# Patient Record
Sex: FEMALE | Race: WHITE | Employment: OTHER | URBAN - NONMETROPOLITAN AREA
[De-identification: names, ages, dates, MRNs, and addresses within clinical notes are randomized per-mention and may not be internally consistent; named-entity substitution may affect disease eponyms.]

---

## 2019-08-29 ENCOUNTER — HOSPITAL ENCOUNTER (EMERGENCY)
Facility: HOSPITAL | Age: 74
Discharge: HOME | End: 2019-08-29
Attending: EMERGENCY MEDICINE | Admitting: EMERGENCY MEDICINE
Payer: MEDICARE

## 2019-08-29 VITALS
RESPIRATION RATE: 18 BRPM | HEART RATE: 74 BPM | SYSTOLIC BLOOD PRESSURE: 159 MMHG | DIASTOLIC BLOOD PRESSURE: 72 MMHG | BODY MASS INDEX: 35.65 KG/M2 | WEIGHT: 200 LBS | OXYGEN SATURATION: 96 % | TEMPERATURE: 98.3 F

## 2019-08-29 DIAGNOSIS — N30.00 ACUTE CYSTITIS WITHOUT HEMATURIA: Primary | ICD-10-CM

## 2019-08-29 LAB
ALBUMIN SERPL-MCNC: 2.9 G/DL (ref 3.4–5)
ALBUMIN UR-MCNC: 100 MG/DL
ALP SERPL-CCNC: 65 U/L (ref 40–150)
ALT SERPL W P-5'-P-CCNC: 40 U/L (ref 0–50)
ANION GAP SERPL CALCULATED.3IONS-SCNC: 10 MMOL/L (ref 3–14)
APPEARANCE UR: ABNORMAL
AST SERPL W P-5'-P-CCNC: 33 U/L (ref 0–45)
BACTERIA #/AREA URNS HPF: ABNORMAL /HPF
BASOPHILS # BLD AUTO: 0 10E9/L (ref 0–0.2)
BASOPHILS NFR BLD AUTO: 0.2 %
BILIRUB SERPL-MCNC: 0.4 MG/DL (ref 0.2–1.3)
BILIRUB UR QL STRIP: NEGATIVE
BUN SERPL-MCNC: 31 MG/DL (ref 7–30)
CALCIUM SERPL-MCNC: 8.4 MG/DL (ref 8.5–10.1)
CHLORIDE SERPL-SCNC: 102 MMOL/L (ref 94–109)
CO2 SERPL-SCNC: 21 MMOL/L (ref 20–32)
COLOR UR AUTO: YELLOW
CREAT SERPL-MCNC: 1.33 MG/DL (ref 0.52–1.04)
CRP SERPL-MCNC: 144 MG/L (ref 0–8)
DIFFERENTIAL METHOD BLD: ABNORMAL
EOSINOPHIL # BLD AUTO: 0 10E9/L (ref 0–0.7)
EOSINOPHIL NFR BLD AUTO: 0.3 %
ERYTHROCYTE [DISTWIDTH] IN BLOOD BY AUTOMATED COUNT: 15.2 % (ref 10–15)
GFR SERPL CREATININE-BSD FRML MDRD: 39 ML/MIN/{1.73_M2}
GLUCOSE SERPL-MCNC: 188 MG/DL (ref 70–99)
GLUCOSE UR STRIP-MCNC: NEGATIVE MG/DL
HCT VFR BLD AUTO: 29.4 % (ref 35–47)
HGB BLD-MCNC: 9.4 G/DL (ref 11.7–15.7)
HGB UR QL STRIP: NEGATIVE
IMM GRANULOCYTES # BLD: 0.1 10E9/L (ref 0–0.4)
IMM GRANULOCYTES NFR BLD: 0.8 %
KETONES UR STRIP-MCNC: NEGATIVE MG/DL
LACTATE BLD-SCNC: 1.7 MMOL/L (ref 0.7–2)
LEUKOCYTE ESTERASE UR QL STRIP: ABNORMAL
LIPASE SERPL-CCNC: 118 U/L (ref 73–393)
LYMPHOCYTES # BLD AUTO: 0.9 10E9/L (ref 0.8–5.3)
LYMPHOCYTES NFR BLD AUTO: 6.4 %
MCH RBC QN AUTO: 28.9 PG (ref 26.5–33)
MCHC RBC AUTO-ENTMCNC: 32 G/DL (ref 31.5–36.5)
MCV RBC AUTO: 91 FL (ref 78–100)
MONOCYTES # BLD AUTO: 1.2 10E9/L (ref 0–1.3)
MONOCYTES NFR BLD AUTO: 8.7 %
NEUTROPHILS # BLD AUTO: 11.1 10E9/L (ref 1.6–8.3)
NEUTROPHILS NFR BLD AUTO: 83.6 %
NITRATE UR QL: NEGATIVE
NRBC # BLD AUTO: 0 10*3/UL
NRBC BLD AUTO-RTO: 0 /100
PH UR STRIP: 6 PH (ref 4.7–8)
PLATELET # BLD AUTO: 245 10E9/L (ref 150–450)
PLATELET # BLD EST: ABNORMAL 10*3/UL
POTASSIUM SERPL-SCNC: 4 MMOL/L (ref 3.4–5.3)
PROT SERPL-MCNC: 7.3 G/DL (ref 6.8–8.8)
RBC # BLD AUTO: 3.25 10E12/L (ref 3.8–5.2)
RBC #/AREA URNS AUTO: 0 /HPF (ref 0–2)
RBC MORPH BLD: ABNORMAL
SODIUM SERPL-SCNC: 133 MMOL/L (ref 133–144)
SOURCE: ABNORMAL
SP GR UR STRIP: 1.02 (ref 1–1.03)
SQUAMOUS #/AREA URNS AUTO: 15 /HPF (ref 0–1)
UROBILINOGEN UR STRIP-MCNC: 2 MG/DL (ref 0–2)
WBC # BLD AUTO: 13.3 10E9/L (ref 4–11)
WBC #/AREA URNS AUTO: >182 /HPF (ref 0–5)
WBC CLUMPS #/AREA URNS HPF: PRESENT /HPF

## 2019-08-29 PROCEDURE — 86140 C-REACTIVE PROTEIN: CPT | Performed by: EMERGENCY MEDICINE

## 2019-08-29 PROCEDURE — 81001 URINALYSIS AUTO W/SCOPE: CPT | Performed by: EMERGENCY MEDICINE

## 2019-08-29 PROCEDURE — 83605 ASSAY OF LACTIC ACID: CPT | Performed by: EMERGENCY MEDICINE

## 2019-08-29 PROCEDURE — 83690 ASSAY OF LIPASE: CPT | Performed by: EMERGENCY MEDICINE

## 2019-08-29 PROCEDURE — 99283 EMERGENCY DEPT VISIT LOW MDM: CPT

## 2019-08-29 PROCEDURE — 36415 COLL VENOUS BLD VENIPUNCTURE: CPT | Performed by: EMERGENCY MEDICINE

## 2019-08-29 PROCEDURE — 85025 COMPLETE CBC W/AUTO DIFF WBC: CPT | Performed by: EMERGENCY MEDICINE

## 2019-08-29 PROCEDURE — 99284 EMERGENCY DEPT VISIT MOD MDM: CPT | Mod: Z6 | Performed by: EMERGENCY MEDICINE

## 2019-08-29 PROCEDURE — 80053 COMPREHEN METABOLIC PANEL: CPT | Performed by: EMERGENCY MEDICINE

## 2019-08-29 RX ORDER — HYDRALAZINE HYDROCHLORIDE 50 MG/1
50 TABLET, FILM COATED ORAL 3 TIMES DAILY
COMMUNITY

## 2019-08-29 RX ORDER — GLIPIZIDE 5 MG/1
5 TABLET ORAL DAILY
COMMUNITY

## 2019-08-29 RX ORDER — CARVEDILOL 25 MG/1
25 TABLET ORAL 2 TIMES DAILY WITH MEALS
COMMUNITY

## 2019-08-29 RX ORDER — NITROFURANTOIN 25; 75 MG/1; MG/1
100 CAPSULE ORAL 2 TIMES DAILY
Qty: 14 CAPSULE | Refills: 0 | Status: SHIPPED | OUTPATIENT
Start: 2019-08-29 | End: 2021-11-19

## 2019-08-29 RX ORDER — LOSARTAN POTASSIUM 100 MG/1
25 TABLET ORAL DAILY
COMMUNITY

## 2019-08-29 RX ORDER — MULTIVITAMIN WITH IRON
1 TABLET ORAL DAILY
COMMUNITY
End: 2021-11-19

## 2019-08-29 RX ORDER — FUROSEMIDE 40 MG
40 TABLET ORAL DAILY
COMMUNITY

## 2019-08-29 RX ORDER — CALCIUM CARBONATE/VITAMIN D3 500-10/5ML
400 LIQUID (ML) ORAL 2 TIMES DAILY
COMMUNITY

## 2019-08-29 ASSESSMENT — ENCOUNTER SYMPTOMS
ARTHRALGIAS: 0
NECK STIFFNESS: 0
DIFFICULTY URINATING: 0
SHORTNESS OF BREATH: 0
ABDOMINAL PAIN: 1
HEADACHES: 0
CONFUSION: 0
EYE REDNESS: 0
FEVER: 0
COLOR CHANGE: 0

## 2019-08-29 NOTE — ED AVS SNAPSHOT
[facility]  [address]  [place] [address]  Phone:  [phone]                                    Dayana Lema   MRN: [id number]    Department:  [facility]   Date of Visit:  8/29/2019           After Visit Summary Signature Page    I have received my discharge instructions, and my questions have been answered. I have discussed any challenges I see with this plan with the nurse or doctor.    ..........................................................................................................................................  Patient/Patient Representative Signature      ..........................................................................................................................................  Patient Representative Print Name and Relationship to Patient    ..................................................               ................................................  Date                                   Time    ..........................................................................................................................................  Reviewed by Signature/Title    ...................................................              ..............................................  Date                                               Time          22EPIC Rev 08/18

## 2019-08-29 NOTE — ED PROVIDER NOTES
History     Chief Complaint   Patient presents with     Abdominal Pain     since Tuesday and is mainly on left flank and goes across abd     HPI  Dayana Lema is a 74 year old female who presented to the ED accompanied by the spouse.  Patient gives a 2-day history of left flank pain that is rated as 2/10.  Pain does not radiate anywhere.  No known aggravating or relieving factors.  Patient has known history of polycystic kidney disease.  Patient takes Lasix every day and also has history of diabetes mellitus, aortic stenosis, hypertension and hypercholesterolemia.  The pain is intermittent and now at the time of history and physical its rated as 1/10.    Allergies:  Allergies   Allergen Reactions     Codeine Nausea and Vomiting       Problem List:    There are no active problems to display for this patient.       Past Medical History:    Past Medical History:   Diagnosis Date     Aortic stenosis 07/2006     Aortic valve disorders 06/18/2009     Bilateral cataracts      Closed fracture of unspecified part of forearm 1957     External hemorrhoids without mention of complication 07/12/2006     Hypertension      Internal hemorrhoids without mention of complication 07/12/2006     Iron deficiency anemia, unspecified 07/12/2006     Other and unspecified ovarian cyst 01/31/2011     Other malaise and fatigue 6/19/02     Pure hypercholesterolemia 06/19/2002     Type 2 diabetes mellitus without complications (H)      Undiagnosed cardiac murmurs 07/11/2006     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     Unspecified essential hypertension      Unspecified hypothyroidism      Unspecified venous (peripheral) insufficiency 11/16/1994       Past Surgical History:    Past Surgical History:   Procedure Laterality Date     COLONOSCOPY       GYN SURGERY       PHACOEMULSIFICATION WITH STANDARD INTRAOCULAR LENS IMPLANT  3/3/2014    Procedure: PHACOEMULSIFICATION WITH STANDARD INTRAOCULAR LENS IMPLANT;  CATARACT  EXTRACTION WITH INTRA OCULAR LENS LEFT;  Surgeon: Robert Connolly MD;  Location: [facility]     PHACOEMULSIFICATION WITH STANDARD INTRAOCULAR LENS IMPLANT  3/24/2014    Procedure: PHACOEMULSIFICATION WITH STANDARD INTRAOCULAR LENS IMPLANT;  CATARACT EXTRACTION WITH INTRA OCULAR LENS RIGHT;  Surgeon: Robert Connolly MD;  Location: [facility]       Family History:    No family history on file.    Social History:  Marital Status:   [2]  Social History     Tobacco Use     Smoking status: Not on file   Substance Use Topics     Alcohol use: Not on file     Drug use: Not on file        Medications:      ATORVASTATIN CALCIUM PO   carvedilol (COREG) 25 MG tablet   Elastic Bandages & Supports (JOBST ANTI-EM THIGH HIGH XL) MISC   ferrous sulfate 325 (65 FE) MG tablet   furosemide (LASIX) 40 MG tablet   glipiZIDE (GLUCOTROL) 5 MG tablet   hydrALAZINE (APRESOLINE) 50 MG tablet   losartan (COZAAR) 25 MG tablet   magnesium 250 MG tablet   magnesium oxide 400 MG CAPS   metFORMIN (GLUCOPHAGE) 850 MG tablet   nitroFURantoin macrocrystal-monohydrate (MACROBID) 100 MG capsule   POTASSIUM CHLORIDE OSEAS ER TAB PO   UNABLE TO FIND   Blood Glucose Monitoring Suppl [place]   calcium-vitamin D (CALTRATE) 600-400 MG-UNIT per tablet   Flaxseed, Linseed, (FLAXSEED OIL) 1000 MG CAPS   glucose blood VI test strips strip   LEVOTHYROXINE SODIUM PO   lisinopril-hydrochlorothiazide (PRINZIDE,ZESTORETIC) 20-12.5 MG per tablet   multivitamin, therapeutic with minerals (MULTI-VITAMIN) TABS   ONETOUCH DELICA LANCETS FINE MISC         Review of Systems   Constitutional: Negative for fever.   HENT: Negative for congestion.    Eyes: Negative for redness.   Respiratory: Negative for shortness of breath.    Cardiovascular: Negative for chest pain.   Gastrointestinal: Positive for abdominal pain.   Genitourinary: Negative for difficulty urinating.   Musculoskeletal: Negative for arthralgias and neck stiffness.   Skin: Negative for color change.   Neurological:  Negative for headaches.   Psychiatric/Behavioral: Negative for confusion.   All other systems reviewed and are negative.      Physical Exam   BP: 148/64  Pulse: 82  Heart Rate: 75  Temp: 99.6  F (37.6  C)  Weight: 90.7 kg (200 lb)  SpO2: 98 %      Physical Exam   Constitutional: She is oriented to person, place, and time. She appears well-developed and well-nourished. No distress.   HENT:   Head: Normocephalic and atraumatic.   Mouth/Throat: Oropharynx is clear and moist. No oropharyngeal exudate.   Eyes: Pupils are equal, round, and reactive to light. No scleral icterus.   Neck: Normal range of motion. Neck supple.   Cardiovascular: Normal rate, regular rhythm, normal heart sounds and intact distal pulses.   Pulmonary/Chest: Effort normal and breath sounds normal. No stridor. No respiratory distress. She has no wheezes.   Abdominal: Soft. Normal aorta and bowel sounds are normal. She exhibits no distension. There is no tenderness. There is no guarding.   Musculoskeletal: She exhibits no edema or tenderness.   Neurological: She is alert and oriented to person, place, and time.   Skin: Skin is warm and dry. No rash noted. She is not diaphoretic. No erythema. No pallor.   Nursing note and vitals reviewed.      ED Course        Procedures      Results for orders placed or performed during the hospital encounter of 08/29/19 (from the past 24 hour(s))   CBC with platelets differential   Result Value Ref Range    WBC 13.3 (H) 4.0 - 11.0 10e9/L    RBC Count 3.25 (L) 3.8 - 5.2 10e12/L    Hemoglobin 9.4 (L) 11.7 - 15.7 g/dL    Hematocrit 29.4 (L) 35.0 - 47.0 %    MCV 91 78 - 100 fl    MCH 28.9 26.5 - 33.0 pg    MCHC 32.0 31.5 - 36.5 g/dL    RDW 15.2 (H) 10.0 - 15.0 %    Platelet Count 245 150 - 450 10e9/L    Diff Method Automated Method     % Neutrophils 83.6 %    % Lymphocytes 6.4 %    % Monocytes 8.7 %    % Eosinophils 0.3 %    % Basophils 0.2 %    % Immature Granulocytes 0.8 %    Nucleated RBCs 0 0 /100    Absolute  Neutrophil 11.1 (H) 1.6 - 8.3 10e9/L    Absolute Lymphocytes 0.9 0.8 - 5.3 10e9/L    Absolute Monocytes 1.2 0.0 - 1.3 10e9/L    Absolute Eosinophils 0.0 0.0 - 0.7 10e9/L    Absolute Basophils 0.0 0.0 - 0.2 10e9/L    Abs Immature Granulocytes 0.1 0 - 0.4 10e9/L    Absolute Nucleated RBC 0.0     RBC Morphology Consistent with reported results     Platelet Estimate       Automated count confirmed.  Platelet morphology is normal.   Comprehensive metabolic panel   Result Value Ref Range    Sodium 133 133 - 144 mmol/L    Potassium 4.0 3.4 - 5.3 mmol/L    Chloride 102 94 - 109 mmol/L    Carbon Dioxide 21 20 - 32 mmol/L    Anion Gap 10 3 - 14 mmol/L    Glucose 188 (H) 70 - 99 mg/dL    Urea Nitrogen 31 (H) 7 - 30 mg/dL    Creatinine 1.33 (H) 0.52 - 1.04 mg/dL    GFR Estimate 39 (L) >60 mL/min/[1.73_m2]    GFR Estimate If Black 46 (L) >60 mL/min/[1.73_m2]    Calcium 8.4 (L) 8.5 - 10.1 mg/dL    Bilirubin Total 0.4 0.2 - 1.3 mg/dL    Albumin 2.9 (L) 3.4 - 5.0 g/dL    Protein Total 7.3 6.8 - 8.8 g/dL    Alkaline Phosphatase 65 40 - 150 U/L    ALT 40 0 - 50 U/L    AST 33 0 - 45 U/L   CRP inflammation   Result Value Ref Range    CRP Inflammation 144.0 (H) 0.0 - 8.0 mg/L   Lactic acid whole blood   Result Value Ref Range    Lactic Acid 1.7 0.7 - 2.0 mmol/L   Lipase   Result Value Ref Range    Lipase 118 73 - 393 U/L   UA reflex to Microscopic and Culture   Result Value Ref Range    Color Urine Yellow     Appearance Urine Slightly Cloudy     Glucose Urine Negative NEG^Negative mg/dL    Bilirubin Urine Negative NEG^Negative    Ketones Urine Negative NEG^Negative mg/dL    Specific Gravity Urine 1.019 1.003 - 1.035    Blood Urine Negative NEG^Negative    pH Urine 6.0 4.7 - 8.0 pH    Protein Albumin Urine 100 (A) NEG^Negative mg/dL    Urobilinogen mg/dL 2.0 0.0 - 2.0 mg/dL    Nitrite Urine Negative NEG^Negative    Leukocyte Esterase Urine Large (A) NEG^Negative    Source Midstream Urine     RBC Urine 0 0 - 2 /HPF    WBC Urine >182 (H) 0  - 5 /HPF    WBC Clumps Present (A) NEG^Negative /HPF    Bacteria Urine Many (A) NEG^Negative /HPF    Squamous Epithelial /HPF Urine 15 (H) 0 - 1 /HPF       Medications - No data to display    Assessments & Plan (with Medical Decision Making)   Acute cystitis without hematuria: Started on nitrofurantoin and encouraged to drink plenty of fluids.  Written and verbal discharge instructions given.  Return to ED if she develops fever, vomiting or worsening of condition.  Verbalized understanding and was in agreement with treatment plan.  Discharged home in stable condition.    I have reviewed the nursing notes.    I have reviewed the findings, diagnosis, plan and need for follow up with the patient.    New Prescriptions    NITROFURANTOIN MACROCRYSTAL-MONOHYDRATE (MACROBID) 100 MG CAPSULE    Take 1 capsule (100 mg) by mouth 2 times daily       Final diagnoses:   Acute cystitis without hematuria       8/29/2019   [facility]     Marcelo Frias MD  08/29/19 1325

## 2019-08-29 NOTE — ED NOTES
Pt to the ED with spouse with c/o left sided abd pain that started on Tuesday.  Pt states initially pain across abd and now more left side slightly to left flank.  Not eating or drinking much since Tuesday.  Small BMs 2-3 times daily.  Denies s/sx of UTI.  Hx polycystic kidney disease.  Assessment is complete.  Call light is given. BP cuff and sat monitor on pt. Spouse at bedside.

## 2019-08-30 NOTE — ED NOTES
Discharge papers and Rx given to pt. Verbalizes understanding of discharge dx, follow up with PCP, fill Rx and start taking.  VS as charted.  Rates pain 2/10.  Discharge ambulatory to home with spouse.

## 2021-11-19 ENCOUNTER — LAB (OUTPATIENT)
Dept: LAB | Facility: OTHER | Age: 76
End: 2021-11-19
Payer: MEDICARE

## 2021-11-19 DIAGNOSIS — N18.30 CKD (CHRONIC KIDNEY DISEASE) STAGE 3, GFR 30-59 ML/MIN (H): Primary | ICD-10-CM

## 2021-11-19 LAB
ALBUMIN SERPL-MCNC: 3.4 G/DL (ref 3.4–5)
ANION GAP SERPL CALCULATED.3IONS-SCNC: 4 MMOL/L (ref 3–14)
BUN SERPL-MCNC: 42 MG/DL (ref 7–30)
CALCIUM SERPL-MCNC: 9.2 MG/DL (ref 8.5–10.1)
CHLORIDE BLD-SCNC: 111 MMOL/L (ref 94–109)
CO2 SERPL-SCNC: 22 MMOL/L (ref 20–32)
CREAT SERPL-MCNC: 1.25 MG/DL (ref 0.52–1.04)
GFR SERPL CREATININE-BSD FRML MDRD: 42 ML/MIN/1.73M2
GLUCOSE BLD-MCNC: 94 MG/DL (ref 70–99)
PHOSPHATE SERPL-MCNC: 3.1 MG/DL (ref 2.5–4.5)
POTASSIUM BLD-SCNC: 4.5 MMOL/L (ref 3.4–5.3)
SODIUM SERPL-SCNC: 137 MMOL/L (ref 133–144)

## 2021-11-19 PROCEDURE — 80069 RENAL FUNCTION PANEL: CPT | Mod: ZL

## 2021-11-19 PROCEDURE — 36415 COLL VENOUS BLD VENIPUNCTURE: CPT | Mod: ZL

## 2021-11-19 RX ORDER — AMLODIPINE BESYLATE 10 MG/1
10 TABLET ORAL DAILY
COMMUNITY

## 2021-11-19 RX ORDER — LANOLIN ALCOHOL/MO/W.PET/CERES
1000 CREAM (GRAM) TOPICAL DAILY
COMMUNITY

## 2023-03-20 ENCOUNTER — NURSING HOME VISIT (OUTPATIENT)
Dept: FAMILY MEDICINE | Facility: OTHER | Age: 78
End: 2023-03-20
Attending: FAMILY MEDICINE
Payer: COMMERCIAL

## 2023-03-20 VITALS
TEMPERATURE: 98.7 F | HEART RATE: 80 BPM | DIASTOLIC BLOOD PRESSURE: 72 MMHG | OXYGEN SATURATION: 95 % | SYSTOLIC BLOOD PRESSURE: 156 MMHG

## 2023-03-20 DIAGNOSIS — E55.9 VITAMIN D DEFICIENCY: ICD-10-CM

## 2023-03-20 DIAGNOSIS — I10 ESSENTIAL HYPERTENSION: ICD-10-CM

## 2023-03-20 DIAGNOSIS — Q61.2 ADPKD (AUTOSOMAL DOMINANT POLYCYSTIC KIDNEY DISEASE): ICD-10-CM

## 2023-03-20 DIAGNOSIS — I12.9 TYPE 2 DIABETES MELLITUS WITH STAGE 3 CHRONIC KIDNEY DISEASE AND HYPERTENSION (H): ICD-10-CM

## 2023-03-20 DIAGNOSIS — Z78.9 NURSING HOME RESIDENT: Primary | ICD-10-CM

## 2023-03-20 DIAGNOSIS — N18.30 TYPE 2 DIABETES MELLITUS WITH STAGE 3 CHRONIC KIDNEY DISEASE AND HYPERTENSION (H): ICD-10-CM

## 2023-03-20 DIAGNOSIS — E78.00 PURE HYPERCHOLESTEROLEMIA: ICD-10-CM

## 2023-03-20 DIAGNOSIS — E03.9 ACQUIRED HYPOTHYROIDISM: ICD-10-CM

## 2023-03-20 DIAGNOSIS — J98.11 ATELECTASIS: ICD-10-CM

## 2023-03-20 DIAGNOSIS — E11.22 TYPE 2 DIABETES MELLITUS WITH STAGE 3 CHRONIC KIDNEY DISEASE AND HYPERTENSION (H): ICD-10-CM

## 2023-03-20 DIAGNOSIS — Z96.652 S/P TOTAL KNEE ARTHROPLASTY, LEFT: ICD-10-CM

## 2023-03-20 DIAGNOSIS — D50.0 IRON DEFICIENCY ANEMIA DUE TO CHRONIC BLOOD LOSS: ICD-10-CM

## 2023-03-20 DIAGNOSIS — M15.0 PRIMARY OSTEOARTHRITIS INVOLVING MULTIPLE JOINTS: ICD-10-CM

## 2023-03-20 DIAGNOSIS — N18.31 STAGE 3A CHRONIC KIDNEY DISEASE (H): ICD-10-CM

## 2023-03-20 DIAGNOSIS — R09.02 HYPOXIA: ICD-10-CM

## 2023-03-20 PROBLEM — D50.9 IRON DEFICIENCY ANEMIA: Status: ACTIVE | Noted: 2021-05-24

## 2023-03-20 PROBLEM — Z87.448 HISTORY OF ACUTE PYELONEPHRITIS: Status: ACTIVE | Noted: 2019-09-05

## 2023-03-20 PROBLEM — E66.01 MORBID OBESITY (H): Status: ACTIVE | Noted: 2018-07-19

## 2023-03-20 PROBLEM — Z82.49 FAMILY HISTORY OF ANEURYSM OF BLOOD VESSEL OF BRAIN: Status: ACTIVE | Noted: 2018-01-16

## 2023-03-20 PROBLEM — I35.8 AORTIC VALVE SCLEROSIS: Status: ACTIVE | Noted: 2023-03-20

## 2023-03-20 PROBLEM — K76.89 LIVER CYST: Status: ACTIVE | Noted: 2018-08-21

## 2023-03-20 PROBLEM — E87.1 HYPONATREMIA: Status: ACTIVE | Noted: 2017-12-29

## 2023-03-20 PROBLEM — K76.0 FATTY LIVER: Status: ACTIVE | Noted: 2018-08-21

## 2023-03-20 PROBLEM — R80.9 PROTEINURIA, UNSPECIFIED TYPE: Status: ACTIVE | Noted: 2018-09-18

## 2023-03-20 PROBLEM — M81.0 AGE-RELATED OSTEOPOROSIS WITHOUT CURRENT PATHOLOGICAL FRACTURE: Status: ACTIVE | Noted: 2019-06-05

## 2023-03-20 PROBLEM — E83.42 HYPOMAGNESEMIA: Status: ACTIVE | Noted: 2018-04-10

## 2023-03-20 PROCEDURE — 99306 1ST NF CARE HIGH MDM 50: CPT | Performed by: FAMILY MEDICINE

## 2023-03-20 RX ORDER — MULTIVIT-MIN/IRON/FOLIC ACID/K 18-600-40
CAPSULE ORAL
COMMUNITY

## 2023-03-20 RX ORDER — OXYCODONE HYDROCHLORIDE 5 MG/1
5 CAPSULE ORAL EVERY 6 HOURS PRN
COMMUNITY

## 2023-03-20 RX ORDER — ASPIRIN 81 MG/1
81 TABLET ORAL DAILY
COMMUNITY

## 2023-03-20 RX ORDER — ACETAMINOPHEN 325 MG/1
325-650 TABLET ORAL EVERY 4 HOURS PRN
COMMUNITY

## 2023-03-20 RX ORDER — AMOXICILLIN 250 MG
2 CAPSULE ORAL 2 TIMES DAILY PRN
COMMUNITY

## 2023-03-22 PROBLEM — M17.12 PRIMARY OSTEOARTHRITIS OF LEFT KNEE: Status: ACTIVE | Noted: 2022-11-16

## 2023-03-22 PROBLEM — N30.00 ACUTE CYSTITIS: Status: ACTIVE | Noted: 2023-03-14

## 2023-03-22 PROBLEM — I51.89 DIASTOLIC DYSFUNCTION: Status: ACTIVE | Noted: 2023-03-08

## 2023-03-22 PROBLEM — R11.14 BILIOUS VOMITING: Status: ACTIVE | Noted: 2023-03-12

## 2023-03-22 PROBLEM — Z96.652 S/P TOTAL KNEE ARTHROPLASTY, LEFT: Status: ACTIVE | Noted: 2023-03-22

## 2023-03-22 PROBLEM — D62 ANEMIA ASSOCIATED WITH ACUTE BLOOD LOSS: Status: ACTIVE | Noted: 2023-03-08

## 2023-03-22 PROBLEM — N17.9 ACUTE KIDNEY INJURY SUPERIMPOSED ON CHRONIC KIDNEY DISEASE (H): Status: ACTIVE | Noted: 2020-07-11

## 2023-03-22 PROBLEM — Z78.9 NURSING HOME RESIDENT: Status: ACTIVE | Noted: 2023-03-22

## 2023-03-22 PROBLEM — J96.01 ACUTE RESPIRATORY FAILURE WITH HYPOXIA (H): Status: ACTIVE | Noted: 2023-03-12

## 2023-03-22 PROBLEM — N18.9 ACUTE KIDNEY INJURY SUPERIMPOSED ON CHRONIC KIDNEY DISEASE (H): Status: ACTIVE | Noted: 2020-07-11

## 2023-03-22 PROBLEM — J18.9 MULTIFOCAL PNEUMONIA: Status: ACTIVE | Noted: 2023-03-12

## 2023-03-22 ASSESSMENT — ENCOUNTER SYMPTOMS
VOICE CHANGE: 0
ADENOPATHY: 0
DIZZINESS: 0
FATIGUE: 1
MYALGIAS: 0
BLOOD IN STOOL: 0
WHEEZING: 0
WOUND: 0
SPEECH DIFFICULTY: 0
DIARRHEA: 0
CONFUSION: 0
NUMBNESS: 0
LEG SWELLING: 1
SEIZURES: 0
PALPITATIONS: 0
FEVER: 0
APPETITE CHANGE: 0
EXTREMITY WEAKNESS: 0
SHORTNESS OF BREATH: 0
ABDOMINAL PAIN: 0
DYSURIA: 0
HEMATURIA: 0
CONSTIPATION: 1
NERVOUS/ANXIOUS: 0
BACK PAIN: 1
TROUBLE SWALLOWING: 0
UNEXPECTED WEIGHT CHANGE: 0
EYE PROBLEMS: 0
COUGH: 0
FREQUENCY: 1
LIGHT-HEADEDNESS: 0
ARTHRALGIAS: 1
SLEEP DISTURBANCE: 0
DEPRESSION: 0

## 2023-03-23 NOTE — PROGRESS NOTES
Nursing Home Initial Visit    HISTORY OF PRESENT ILLNESS:  Dayana is a 78 year old female (1945)  resident of [facility]  who is being seen today for initial Nursing Home Visit.     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    She was admitted from [facility] after undergoing left total knee arthroplasty secondary to progressive osteoarthritis.  Her postoperative course was complicated by acute cystitis as well as blood loss anemia.    The patient notes controlled pain.    Discussed with nursing staff who note her to have hypoxia worsened with ambulation.  Her O2 sats are reviewed    The patient is seen in her room.  Family is not present.     Medical records are reviewed.    Current medications, allergies, and interdisciplinary care plan are reviewed.      Patient Active Problem List    Diagnosis Date Noted     Aortic valve sclerosis 03/20/2023     Priority: Medium     Formatting of this note might be different from the original.  Stable per echocardiogram 4/2015  IMO Update 10/11       Iron deficiency anemia 05/24/2021     Priority: Medium     Type 2 diabetes mellitus with stage 3 chronic kidney disease and hypertension (H) 07/09/2020     Priority: Medium     Formatting of this note might be different from the original.  Started metformin 2014    Formatting of this note might be different from the original.  1/14/2020 ASSESSMENT:   Essential hypertension  (primary encounter diagnosis)  Type 2 diabetes mellitus without complication, without long-term current use of insulin   (CKD (chronic kidney disease) stage 3, GFR 30-59 ml/min       History of acute pyelonephritis 09/05/2019     Priority: Medium     Age-related  osteoporosis without current pathological fracture 06/05/2019     Priority: Medium     CKD (chronic kidney disease) stage 3, GFR 30-59 ml/min (H) 12/13/2018     Priority: Medium     Vitamin D deficiency 12/13/2018     Priority: Medium     Proteinuria, unspecified type 09/18/2018     Priority: Medium     Fatty liver 08/21/2018     Priority: Medium     Liver cyst 08/21/2018     Priority: Medium     Morbid obesity (H) 07/19/2018     Priority: Medium     Formatting of this note might be different from the original.  BMI 37.7; DM, HTN       Hypomagnesemia 04/10/2018     Priority: Medium     ADPKD (autosomal dominant polycystic kidney disease) 01/16/2018     Priority: Medium     Family history of aneurysm of blood vessel of brain 01/16/2018     Priority: Medium     Hyponatremia 12/29/2017     Priority: Medium     Primary osteoarthritis involving multiple joints 03/27/2017     Priority: Medium     Colon adenoma 07/18/2016     Priority: Medium     Formatting of this note might be different from the original.  7/14/16 colonoscopy 5 year recheck       Hemorrhoids 10/24/2005     Priority: Medium     Formatting of this note might be different from the original.       Hypothyroidism 10/08/2003     Priority: Medium     Essential hypertension 06/19/2002     Priority: Medium     Pure hypercholesterolemia 06/19/2002     Priority: Medium     Venous insufficiency 11/16/1994     Priority: Medium     Formatting of this note might be different from the original.  Mild deep venous insufficiency, left leg.  Noninvasive peripheral vascular lab 8/24/94:  IPG normal but Doppler suspicious for partial venous obstruction in left lower extremity, age indeterminate.  IMO Update 10/11            Social History     Socioeconomic History     Marital status:      Spouse name: Not on file     Number of children: Not on file     Years of education: Not on file     Highest education level: Not on file   Occupational History     Not on file    Tobacco Use     Smoking status: Not on file     Smokeless tobacco: Not on file   Substance and Sexual Activity     Alcohol use: Not on file     Drug use: Not on file     Sexual activity: Not on file   Other Topics Concern     Not on file   Social History Narrative     Not on file     Social Determinants of Health     Financial Resource Strain: Not on file   Food Insecurity: Not on file   Transportation Needs: Not on file   Physical Activity: Not on file   Stress: Not on file   Social Connections: Not on file   Intimate Partner Violence: Not on file   Housing Stability: Not on file        Current Outpatient Medications   Medication Sig     acetaminophen (TYLENOL) 325 MG tablet Take 325-650 mg by mouth every 4 hours as needed for mild pain     amLODIPine (NORVASC) 10 MG tablet Take 10 mg by mouth daily     Ascorbic Acid (VITAMIN C) 500 MG CAPS      aspirin 81 MG EC tablet Take 81 mg by mouth daily     ATORVASTATIN CALCIUM PO Take 10 mg by mouth daily At bedtime.     Blood Glucose Monitoring Suppl [place] One Touch Ultra 2.  For use to test blood glucose daily.     carvedilol (COREG) 25 MG tablet Take 25 mg by mouth 2 times daily (with meals)     cyanocobalamin (VITAMIN B-12) 1000 MCG tablet Take 1,000 mcg by mouth daily     Elastic Bandages & Supports (JOBST ANTI-EM THIGH HIGH XL) MISC      ferrous sulfate 325 (65 FE) MG tablet Take 325 mg by mouth daily      furosemide (LASIX) 40 MG tablet Take 40 mg by mouth daily     glipiZIDE (GLUCOTROL) 5 MG tablet Take 5 mg by mouth daily      glucose blood VI test strips strip daily One Touch Ultra test strips.  For use to test blood glucose daily.     hydrALAZINE (APRESOLINE) 50 MG tablet Take 50 mg by mouth 3 times daily     LEVOTHYROXINE SODIUM PO Take 112 mcg by mouth daily      losartan (COZAAR) 100 MG tablet Take 25 mg by mouth daily     magnesium oxide 400 MG CAPS Take 400 mg by mouth 2 times daily     metFORMIN (GLUCOPHAGE) 850 MG tablet Take 850 mg by mouth daily (with  dinner)     ONETOUCH DELICA LANCETS FINE MISC For use to test blood glucose daily.     oxyCODONE (OXY-IR) 5 MG capsule Take 5 mg by mouth every 6 hours as needed for severe pain (7-10)     senna-docusate (SENOKOT-S/PERICOLACE) 8.6-50 MG tablet Take 2 tablets by mouth 2 times daily as needed for constipation     UNABLE TO FIND MEDICATION NAME:  Move Free     No current facility-administered medications for this visit.       Allergies   Allergen Reactions     Codeine Nausea and Vomiting     Hydrochlorothiazide      Low sodium 118 [place] winter 2016     Morphine Other (See Comments)     Gi upset       I have reviewed the care plan and do agree with the plan.      ROS:  Review of Systems   Constitutional: Positive for fatigue. Negative for appetite change, fever and unexpected weight change.   HENT:   Negative for hearing loss, trouble swallowing and voice change.    Eyes: Negative for eye problems.   Respiratory: Negative for cough, shortness of breath and wheezing.    Cardiovascular: Positive for leg swelling. Negative for chest pain and palpitations.   Gastrointestinal: Positive for constipation. Negative for abdominal pain, blood in stool and diarrhea.   Genitourinary: Positive for frequency. Negative for bladder incontinence, dysuria and hematuria.    Musculoskeletal: Positive for arthralgias and back pain. Negative for gait problem and myalgias.   Skin: Negative for itching, rash and wound.   Neurological: Negative for dizziness, extremity weakness, gait problem, light-headedness, numbness, seizures and speech difficulty.   Hematological: Negative for adenopathy.   Psychiatric/Behavioral: Negative for confusion, depression and sleep disturbance. The patient is not nervous/anxious.    All other systems reviewed and are negative.        OBJECTIVE:  BP (!) 156/72   Pulse 80   Temp 98.7  F (37.1  C)   SpO2 95%     Physical Exam  Vitals and nursing note reviewed.   Constitutional:       General: She is not in acute  distress.     Appearance: Normal appearance. She is well-developed. She is obese.   HENT:      Head: Normocephalic and atraumatic.      Right Ear: External ear normal.      Left Ear: External ear normal.      Nose: Nose normal.      Mouth/Throat:      Mouth: Mucous membranes are moist.   Eyes:      Extraocular Movements: Extraocular movements intact.      Conjunctiva/sclera: Conjunctivae normal.      Pupils: Pupils are equal, round, and reactive to light.   Cardiovascular:      Rate and Rhythm: Normal rate and regular rhythm.      Heart sounds: Normal heart sounds. No murmur heard.    No friction rub. No gallop.   Pulmonary:      Effort: Pulmonary effort is normal.      Breath sounds: Normal breath sounds.   Abdominal:      General: Abdomen is flat. Bowel sounds are normal.      Palpations: Abdomen is soft.   Musculoskeletal:         General: Swelling and tenderness present.      Left lower leg: Edema present.      Comments: Wound is intact   Skin:     General: Skin is warm and dry.   Neurological:      General: No focal deficit present.      Mental Status: She is alert and oriented to person, place, and time.   Psychiatric:         Mood and Affect: Mood normal.         Behavior: Behavior normal.         Thought Content: Thought content normal.               Lab/Diagnostic data:    Reviewed    ASSESSMENT/ORDERS:  Nursing Home Visit  Discussed with staff. Care plan reviewed and orders updated. She will be admitted short term rehabilitation  Chronic issues are stable. Routine 30 day Nursing Home follow up.     I certify that this patient, Dayana Lema has been under my care (or a nurse practitioner or physican's assistant working with me). This is the face-to-face encounter for oxygen medical necessity.      At the time of this encounter supplemental oxygen is reasonable and necessary and is expected to improve the patient's condition in a home setting.       Patient has continued oxygen desaturation due to  atelectasis with hypoxia.    If portability is ordered, is the patient mobile within the home? yes    Atelectasis with hypoxia  Suspect post op with temporary atelectasis.  Will continue oxygen supplementation with weaning as appropriate.    S/P total knee arthroplasty, left  Records reviewed.  She will continue Physical Therapy and Occupational Therapy    ADPKD (autosomal dominant polycystic kidney disease)  Followed by Nephrology in the past.    Stage 3a chronic kidney disease (H)  Most recent GFR, creatinine, and UA reviewed.  Primary cause is felt to be diabetes mellitus, type 2 as well as hypertension .  Will follow every 6 months.      Type 2 diabetes mellitus with stage 3 chronic kidney disease and hypertension (H)  Most recent fasting blood glucose and hemoglobin A1C reviewed.  Goals discussed.  Will continue metformin and glipizide. Follow blood glucose readings and repeat hemoglobin A1C in 6 months.    Essential hypertension  Available blood pressure readings are reviewed.  Goals discussed. Will continue carvidolol, hydralazine, losartan, and amlodipine (Norvasc). Monitor electrolytes and renal function every 6 months.      Pure hypercholesterolemia  Most recent lipid profile reviewed.  Role of medication therapy in the elderly discussed.  Continue atorvastatin. Will follow LFT every 6 months with annual lipid profile.      Acquired hypothyroidism  Most recent TSH is reviewed.   Continue levothyroxine replacement.     Iron deficiency anemia due to chronic blood loss  Post op anemia.  On iron replacement. Will follow hemoglobin    Vitamin D deficiency  Will follow    Primary osteoarthritis involving multiple joints  Continue pain control        Total time spent with patient visit was 45 min including patient visit, review of pertinent clinical information, and treatment plan.      Ricki Oquendo MD FAAFP [facility]  Geriatrics  Hospice and Palliative Care

## 2023-03-30 ENCOUNTER — TELEPHONE (OUTPATIENT)
Dept: FAMILY MEDICINE | Facility: OTHER | Age: 78
End: 2023-03-30

## 2023-03-30 PROBLEM — N39.0 URINARY TRACT INFECTION WITHOUT HEMATURIA: Status: ACTIVE | Noted: 2023-03-13

## 2023-03-30 PROBLEM — R06.09 DYSPNEA ON EXERTION: Status: ACTIVE | Noted: 2023-03-08
